# Patient Record
Sex: MALE | Race: WHITE | NOT HISPANIC OR LATINO | Employment: OTHER | URBAN - METROPOLITAN AREA
[De-identification: names, ages, dates, MRNs, and addresses within clinical notes are randomized per-mention and may not be internally consistent; named-entity substitution may affect disease eponyms.]

---

## 2019-02-07 ENCOUNTER — HOSPITAL ENCOUNTER (OUTPATIENT)
Dept: LAB | Facility: HOSPITAL | Age: 59
Discharge: HOME | End: 2019-02-07
Attending: INTERNAL MEDICINE

## 2019-02-07 LAB
25(OH)D3 SERPL-MCNC: 27.7 NG/ML (ref 30–100)
ALBUMIN SERPL-MCNC: 4.3 G/DL (ref 3.5–5)
ALBUMIN/GLOB SERPL: 1.3 {RATIO} (ref 1.4–2.6)
ALP SERPL-CCNC: 34 U/L (ref 56–119)
ALT SERPL-CCNC: 14 U/L (ref 10–40)
ANION GAP SERPL CALC-SCNC: 19 MMOL/L (ref 8–19)
AST SERPL-CCNC: 15 U/L (ref 15–50)
BASOPHILS # BLD AUTO: 0.05 10*3/UL (ref 0–0.2)
BASOPHILS NFR BLD AUTO: 0.58 % (ref 0–3)
BILIRUB SERPL-MCNC: 0.6 MG/DL (ref 0.2–1.3)
BUN SERPL-MCNC: 19 MG/DL (ref 5–25)
BUN/CREAT SERPL: 14 {RATIO} (ref 6–20)
CALCIUM SERPL-MCNC: 9.5 MG/DL (ref 8.7–10.4)
CHLORIDE SERPL-SCNC: 107 MMOL/L (ref 99–111)
CHOLEST SERPL-MCNC: 174 MG/DL (ref 107–200)
CHOLEST/HDLC SERPL: 4.8 {RATIO} (ref 3–6)
CONV CO2: 22 MMOL/L (ref 22–32)
CONV TOTAL PROTEIN: 7.5 G/DL (ref 6.3–8.2)
CREAT UR-MCNC: 1.32 MG/DL (ref 0.7–1.2)
EOSINOPHIL # BLD AUTO: 0.37 10*3/UL (ref 0–0.7)
EOSINOPHIL # BLD AUTO: 4.26 % (ref 0–7)
ERYTHROCYTE [DISTWIDTH] IN BLOOD BY AUTOMATED COUNT: 12 % (ref 11.5–14.5)
FOLATE SERPL-MCNC: 5.8 NG/ML (ref 4.8–20)
GFR SERPLBLD BASED ON 1.73 SQ M-ARVRAT: 58 ML/MIN/{1.73_M2}
GLOBULIN UR ELPH-MCNC: 3.2 G/DL (ref 2–3.5)
GLUCOSE SERPL-MCNC: 92 MG/DL (ref 70–99)
HBA1C MFR BLD: 14.9 G/DL (ref 14–18)
HCT VFR BLD AUTO: 43.8 % (ref 42–52)
HDLC SERPL-MCNC: 36 MG/DL (ref 40–60)
LDLC SERPL CALC-MCNC: 110 MG/DL (ref 70–100)
LYMPHOCYTES # BLD AUTO: 2.1 10*3/UL (ref 1–5)
MCH RBC QN AUTO: 29.7 PG (ref 27–31)
MCHC RBC AUTO-ENTMCNC: 34.1 G/DL (ref 33–37)
MCV RBC AUTO: 87 FL (ref 80–96)
MONOCYTES # BLD AUTO: 0.8 10*3/UL (ref 0.2–1.2)
MONOCYTES NFR BLD AUTO: 9.17 % (ref 3–10)
NEUTROPHILS # BLD AUTO: 5.35 10*3/UL (ref 2–8)
NEUTROPHILS NFR BLD AUTO: 61.7 % (ref 30–85)
NRBC BLD AUTO-RTO: 0 % (ref 0–0.01)
OSMOLALITY SERPL CALC.SUM OF ELEC: 302 MOSM/KG (ref 273–304)
PLATELET # BLD AUTO: 182 10*3/UL (ref 130–400)
PMV BLD AUTO: 8.9 FL (ref 7.4–10.4)
POTASSIUM SERPL-SCNC: 3.3 MMOL/L (ref 3.5–5.3)
PSA SERPL-MCNC: 3.92 NG/ML (ref 0–4)
RBC # BLD AUTO: 5.03 10*6/UL (ref 4.7–6.1)
SODIUM SERPL-SCNC: 145 MMOL/L (ref 135–147)
TRIGL SERPL-MCNC: 140 MG/DL (ref 40–150)
VARIANT LYMPHS NFR BLD MANUAL: 24.3 % (ref 20–45)
VIT B12 SERPL-MCNC: 301 PG/ML (ref 211–911)
VLDLC SERPL-MCNC: 28 MG/DL (ref 5–37)
WBC # BLD AUTO: 8.67 10*3/UL (ref 4.8–10.8)

## 2020-02-06 ENCOUNTER — HOSPITAL ENCOUNTER (OUTPATIENT)
Dept: LAB | Facility: HOSPITAL | Age: 60
Discharge: HOME | End: 2020-02-06
Attending: INTERNAL MEDICINE

## 2020-02-06 LAB
BASOPHILS # BLD AUTO: 0.09 10*3/UL (ref 0–0.2)
BASOPHILS NFR BLD AUTO: 0.9 % (ref 0–3)
CONV ABS IMM GRAN: 0.06 10*3/UL (ref 0–0.2)
CONV IMMATURE GRAN: 0.6 % (ref 0–1.8)
DEPRECATED RDW RBC AUTO: 42.7 FL (ref 35.1–43.9)
EOSINOPHIL # BLD AUTO: 0.3 10*3/UL (ref 0–0.7)
EOSINOPHIL # BLD AUTO: 2.8 % (ref 0–7)
ERYTHROCYTE [DISTWIDTH] IN BLOOD BY AUTOMATED COUNT: 13.5 % (ref 11.6–14.4)
HCT VFR BLD AUTO: 47 % (ref 42–52)
HGB BLD-MCNC: 15.3 G/DL (ref 14–18)
LYMPHOCYTES # BLD AUTO: 2.29 10*3/UL (ref 1–5)
LYMPHOCYTES NFR BLD AUTO: 21.7 % (ref 20–45)
MCH RBC QN AUTO: 28.4 PG (ref 27–31)
MCHC RBC AUTO-ENTMCNC: 32.6 G/DL (ref 33–37)
MCV RBC AUTO: 87.2 FL (ref 80–96)
MONOCYTES # BLD AUTO: 1.08 10*3/UL (ref 0.2–1.2)
MONOCYTES NFR BLD AUTO: 10.2 % (ref 3–10)
NEUTROPHILS # BLD AUTO: 6.72 10*3/UL (ref 2–8)
NEUTROPHILS NFR BLD AUTO: 63.8 % (ref 30–85)
NRBC CBCN: 0 % (ref 0–0.7)
PLATELET # BLD AUTO: 215 10*3/UL (ref 130–400)
PMV BLD AUTO: 11.9 FL (ref 9.4–12.4)
PSA SERPL-MCNC: 4.01 NG/ML (ref 0–4)
RBC # BLD AUTO: 5.39 10*6/UL (ref 4.7–6.1)
WBC # BLD AUTO: 10.54 10*3/UL (ref 4.8–10.8)

## 2020-02-07 LAB
ALBUMIN SERPL-MCNC: 4.5 G/DL (ref 3.5–5)
ALBUMIN/GLOB SERPL: 1.5 {RATIO} (ref 1.4–2.6)
ALP SERPL-CCNC: 36 U/L (ref 56–155)
ALT SERPL-CCNC: 18 U/L (ref 10–40)
ANION GAP SERPL CALC-SCNC: 18 MMOL/L (ref 8–19)
AST SERPL-CCNC: 18 U/L (ref 15–50)
BILIRUB SERPL-MCNC: 0.33 MG/DL (ref 0.2–1.3)
BUN SERPL-MCNC: 19 MG/DL (ref 5–25)
BUN/CREAT SERPL: 16 {RATIO} (ref 6–20)
CALCIUM SERPL-MCNC: 10.7 MG/DL (ref 8.7–10.4)
CHLORIDE SERPL-SCNC: 103 MMOL/L (ref 99–111)
CHOLEST SERPL-MCNC: 162 MG/DL (ref 107–200)
CHOLEST/HDLC SERPL: 5.4 {RATIO} (ref 3–6)
CONV CO2: 21 MMOL/L (ref 22–32)
CONV TOTAL PROTEIN: 7.5 G/DL (ref 6.3–8.2)
CREAT UR-MCNC: 1.16 MG/DL (ref 0.7–1.2)
GFR SERPLBLD BASED ON 1.73 SQ M-ARVRAT: >60 ML/MIN/{1.73_M2}
GLOBULIN UR ELPH-MCNC: 3 G/DL (ref 2–3.5)
GLUCOSE SERPL-MCNC: 87 MG/DL (ref 70–99)
HDLC SERPL-MCNC: 30 MG/DL (ref 40–60)
LDLC SERPL CALC-MCNC: 86 MG/DL (ref 70–100)
OSMOLALITY SERPL CALC.SUM OF ELEC: 288 MOSM/KG (ref 273–304)
POTASSIUM SERPL-SCNC: 3.9 MMOL/L (ref 3.5–5.3)
SODIUM SERPL-SCNC: 138 MMOL/L (ref 135–147)
TRIGL SERPL-MCNC: 231 MG/DL (ref 40–150)
VLDLC SERPL-MCNC: 46 MG/DL (ref 5–37)

## 2021-07-09 DIAGNOSIS — I10 HYPERTENSION, UNSPECIFIED TYPE: Primary | ICD-10-CM

## 2021-07-09 RX ORDER — LOSARTAN POTASSIUM 100 MG/1
100 TABLET ORAL DAILY
Qty: 90 TABLET | Refills: 1 | Status: SHIPPED | OUTPATIENT
Start: 2021-07-09 | End: 2022-01-05

## 2021-07-09 RX ORDER — LOSARTAN POTASSIUM 100 MG/1
100 TABLET ORAL DAILY
COMMUNITY
Start: 2021-04-11 | End: 2021-07-09 | Stop reason: SDUPTHER

## 2021-08-02 RX ORDER — POTASSIUM CHLORIDE 750 MG/1
TABLET, FILM COATED, EXTENDED RELEASE ORAL
Qty: 90 TABLET | Refills: 0 | Status: SHIPPED | OUTPATIENT
Start: 2021-08-02 | End: 2021-11-08

## 2021-09-08 RX ORDER — CETIRIZINE HYDROCHLORIDE 10 MG/1
TABLET ORAL
Qty: 90 TABLET | Refills: 1 | Status: SHIPPED | OUTPATIENT
Start: 2021-09-08 | End: 2022-03-21

## 2021-09-10 NOTE — PROGRESS NOTES
Chief Complaint/ HPI:   Follow-up (Pt. is here for routine check up, no labs done)  Coughing bad, here to follow-up with high blood pressure, high cholesterol, and reflux,  Complains of feet and legs hurting especially at night cannot sleep at times,  Objective   Vital Signs  Vitals:    09/13/21 1453   BP: 146/77   BP Location: Left arm   Patient Position: Sitting   Cuff Size: Large Adult   Pulse: 59   Temp: 97.5 °F (36.4 °C)   SpO2: 97%   Weight: 81.7 kg (180 lb 3.2 oz)      There is no height or weight on file to calculate BMI.  Review of Systems   Constitutional: Negative.    HENT: Negative.    Eyes: Negative.    Respiratory: Negative.    Cardiovascular: Negative.    Gastrointestinal: Negative.    Endocrine: Negative.    Genitourinary: Negative.    Musculoskeletal: Negative.    Allergic/Immunologic: Negative.    Neurological: Negative.    Hematological: Negative.    Psychiatric/Behavioral: Negative.    Bad cough,  Physical Exam  Constitutional:       General: He is not in acute distress.     Appearance: Normal appearance.   HENT:      Head: Normocephalic.      Mouth/Throat:      Mouth: Mucous membranes are moist.   Eyes:      Conjunctiva/sclera: Conjunctivae normal.      Pupils: Pupils are equal, round, and reactive to light.   Cardiovascular:      Rate and Rhythm: Normal rate and regular rhythm.      Pulses: Normal pulses.      Heart sounds: Normal heart sounds.   Pulmonary:      Effort: Pulmonary effort is normal.      Breath sounds: Normal breath sounds.   Abdominal:      General: Abdomen is flat. Bowel sounds are normal.      Palpations: Abdomen is soft.   Musculoskeletal:         General: No swelling. Normal range of motion.      Cervical back: Neck supple.   Skin:     General: Skin is warm and dry.      Coloration: Skin is not jaundiced.   Neurological:      Mental Status: He is alert and oriented to person, place, and time. Mental status is at baseline.      Motor: Weakness (Right upper extremity and right  lower extremity chronic weakness,) present.   Psychiatric:         Mood and Affect: Mood normal.         Behavior: Behavior normal.         Thought Content: Thought content normal.         Judgment: Judgment normal.     Patient has a 2-3+ PT pulse bilateral, lower legs are warm and dry with no edema    Result Review :   No results found for: PROBNP, BNP          No results found for: TSH   No results found for: FREET4                       No diagnosis found.    Assessment and Plan      B/L cerumen impactions-- irrigate today April 13, 2021    insomnia     mild anxiety ---    Heart murmur, discussed diagnosis of August 2020 recommend echo     Left foot trauma with numbness persisted over the past 12 months, will refer to podiatry for second opinion--NEVER SAW --old trauma to the left foot    CEREBRAL PALSY R ARM ,, right LE    Hypertension--Metoprolol 25 mg twice a day, Norvasc 5 mg daily,Losartan 100 mg daily, Potassium 10 MEQ daily,    Elevated cholesterol, triglycerides--Fenofibrate 160 mg daily    Gastroesophageal reflux,--PEPCID OTC QD     TOB ABUSE, REC QUITTING    Discussions regarding colonoscopy and endoscopy today February 2019 patient wants to wait,    Follow Up   No follow-ups on file.  Patient was given instructions and counseling regarding his condition or for health maintenance advice. Please see specific information pulled into the AVS if appropriate.

## 2021-09-13 ENCOUNTER — OFFICE VISIT (OUTPATIENT)
Dept: INTERNAL MEDICINE | Facility: CLINIC | Age: 61
End: 2021-09-13

## 2021-09-13 VITALS
OXYGEN SATURATION: 97 % | WEIGHT: 180.2 LBS | SYSTOLIC BLOOD PRESSURE: 146 MMHG | TEMPERATURE: 97.5 F | HEART RATE: 59 BPM | DIASTOLIC BLOOD PRESSURE: 77 MMHG

## 2021-09-13 DIAGNOSIS — Z12.5 SCREENING PSA (PROSTATE SPECIFIC ANTIGEN): ICD-10-CM

## 2021-09-13 DIAGNOSIS — I10 HYPERTENSION, ESSENTIAL: Primary | ICD-10-CM

## 2021-09-13 DIAGNOSIS — E78.2 MIXED HYPERLIPIDEMIA: ICD-10-CM

## 2021-09-13 DIAGNOSIS — G60.9 PERIPHERAL NEUROPATHY, IDIOPATHIC: ICD-10-CM

## 2021-09-13 DIAGNOSIS — K21.9 GASTROESOPHAGEAL REFLUX DISEASE WITHOUT ESOPHAGITIS: ICD-10-CM

## 2021-09-13 PROCEDURE — 99214 OFFICE O/P EST MOD 30 MIN: CPT | Performed by: INTERNAL MEDICINE

## 2021-09-13 RX ORDER — FENOFIBRATE 160 MG/1
160 TABLET ORAL DAILY
COMMUNITY
Start: 2021-07-28 | End: 2021-10-27

## 2021-09-13 RX ORDER — GABAPENTIN 100 MG/1
100 CAPSULE ORAL 3 TIMES DAILY
Qty: 90 CAPSULE | Refills: 2 | Status: SHIPPED | OUTPATIENT
Start: 2021-09-13

## 2021-09-13 RX ORDER — AMLODIPINE BESYLATE 5 MG/1
5 TABLET ORAL DAILY
COMMUNITY
Start: 2021-07-15

## 2021-09-29 ENCOUNTER — LAB (OUTPATIENT)
Dept: LAB | Facility: HOSPITAL | Age: 61
End: 2021-09-29

## 2021-09-29 DIAGNOSIS — G60.9 PERIPHERAL NEUROPATHY, IDIOPATHIC: ICD-10-CM

## 2021-09-29 DIAGNOSIS — Z12.5 SCREENING PSA (PROSTATE SPECIFIC ANTIGEN): ICD-10-CM

## 2021-09-29 DIAGNOSIS — E78.2 MIXED HYPERLIPIDEMIA: ICD-10-CM

## 2021-09-29 DIAGNOSIS — K21.9 GASTROESOPHAGEAL REFLUX DISEASE WITHOUT ESOPHAGITIS: ICD-10-CM

## 2021-09-29 DIAGNOSIS — I10 HYPERTENSION, ESSENTIAL: ICD-10-CM

## 2021-09-29 LAB
ALBUMIN SERPL-MCNC: 4.3 G/DL (ref 3.5–5.2)
ALBUMIN/GLOB SERPL: 1.5 G/DL
ALP SERPL-CCNC: 36 U/L (ref 39–117)
ALT SERPL W P-5'-P-CCNC: 15 U/L (ref 1–41)
ANION GAP SERPL CALCULATED.3IONS-SCNC: 10.2 MMOL/L (ref 5–15)
AST SERPL-CCNC: 18 U/L (ref 1–40)
BASOPHILS # BLD AUTO: 0.09 10*3/MM3 (ref 0–0.2)
BASOPHILS NFR BLD AUTO: 0.9 % (ref 0–1.5)
BILIRUB SERPL-MCNC: 0.3 MG/DL (ref 0–1.2)
BUN SERPL-MCNC: 25 MG/DL (ref 8–23)
BUN/CREAT SERPL: 21.9 (ref 7–25)
CALCIUM SPEC-SCNC: 9.5 MG/DL (ref 8.6–10.5)
CHLORIDE SERPL-SCNC: 107 MMOL/L (ref 98–107)
CHOLEST SERPL-MCNC: 140 MG/DL (ref 0–200)
CO2 SERPL-SCNC: 22.8 MMOL/L (ref 22–29)
CREAT SERPL-MCNC: 1.14 MG/DL (ref 0.76–1.27)
DEPRECATED RDW RBC AUTO: 44.3 FL (ref 37–54)
EOSINOPHIL # BLD AUTO: 0.36 10*3/MM3 (ref 0–0.4)
EOSINOPHIL NFR BLD AUTO: 3.6 % (ref 0.3–6.2)
ERYTHROCYTE [DISTWIDTH] IN BLOOD BY AUTOMATED COUNT: 13.6 % (ref 12.3–15.4)
GFR SERPL CREATININE-BSD FRML MDRD: 65 ML/MIN/1.73
GLOBULIN UR ELPH-MCNC: 2.9 GM/DL
GLUCOSE SERPL-MCNC: 93 MG/DL (ref 65–99)
HCT VFR BLD AUTO: 41.3 % (ref 37.5–51)
HDLC SERPL-MCNC: 28 MG/DL (ref 40–60)
HGB BLD-MCNC: 13.7 G/DL (ref 13–17.7)
IMM GRANULOCYTES # BLD AUTO: 0.08 10*3/MM3 (ref 0–0.05)
IMM GRANULOCYTES NFR BLD AUTO: 0.8 % (ref 0–0.5)
LDLC SERPL CALC-MCNC: 83 MG/DL (ref 0–100)
LDLC/HDLC SERPL: 2.82 {RATIO}
LYMPHOCYTES # BLD AUTO: 1.97 10*3/MM3 (ref 0.7–3.1)
LYMPHOCYTES NFR BLD AUTO: 19.4 % (ref 19.6–45.3)
MCH RBC QN AUTO: 29.1 PG (ref 26.6–33)
MCHC RBC AUTO-ENTMCNC: 33.2 G/DL (ref 31.5–35.7)
MCV RBC AUTO: 87.7 FL (ref 79–97)
MONOCYTES # BLD AUTO: 1.05 10*3/MM3 (ref 0.1–0.9)
MONOCYTES NFR BLD AUTO: 10.4 % (ref 5–12)
NEUTROPHILS NFR BLD AUTO: 6.58 10*3/MM3 (ref 1.7–7)
NEUTROPHILS NFR BLD AUTO: 64.9 % (ref 42.7–76)
NRBC BLD AUTO-RTO: 0 /100 WBC (ref 0–0.2)
PLATELET # BLD AUTO: 542 10*3/MM3 (ref 140–450)
PMV BLD AUTO: 10.4 FL (ref 6–12)
POTASSIUM SERPL-SCNC: 3.7 MMOL/L (ref 3.5–5.2)
PROT SERPL-MCNC: 7.2 G/DL (ref 6–8.5)
PSA SERPL-MCNC: 4.75 NG/ML (ref 0–4)
RBC # BLD AUTO: 4.71 10*6/MM3 (ref 4.14–5.8)
SODIUM SERPL-SCNC: 140 MMOL/L (ref 136–145)
TRIGL SERPL-MCNC: 165 MG/DL (ref 0–150)
VLDLC SERPL-MCNC: 29 MG/DL (ref 5–40)
WBC # BLD AUTO: 10.13 10*3/MM3 (ref 3.4–10.8)

## 2021-09-29 PROCEDURE — G0103 PSA SCREENING: HCPCS

## 2021-09-29 PROCEDURE — 80053 COMPREHEN METABOLIC PANEL: CPT

## 2021-09-29 PROCEDURE — 80061 LIPID PANEL: CPT

## 2021-09-29 PROCEDURE — 85025 COMPLETE CBC W/AUTO DIFF WBC: CPT

## 2021-09-29 PROCEDURE — 36415 COLL VENOUS BLD VENIPUNCTURE: CPT

## 2021-09-30 ENCOUNTER — TELEPHONE (OUTPATIENT)
Dept: INTERNAL MEDICINE | Facility: CLINIC | Age: 61
End: 2021-09-30

## 2021-09-30 DIAGNOSIS — R97.20 PSA ELEVATION: Primary | ICD-10-CM

## 2021-09-30 RX ORDER — CIPROFLOXACIN 500 MG/1
500 TABLET, FILM COATED ORAL 2 TIMES DAILY
Qty: 20 TABLET | Refills: 0 | Status: SHIPPED | OUTPATIENT
Start: 2021-09-30

## 2021-09-30 NOTE — TELEPHONE ENCOUNTER
Call patient, tell him lab work looked okay but his prostate test was higher and I think he needs to possibly see a urologist,-------------- make him a referral to Dr. Gil or to Dr. PERAZA or Dr. Snider    Also tell him we normally treat this with an antibiotic and then recheck it again in 1 to 2 months    Cipro 500 mg twice a day #20 no refills--- send this into patient's pharmacy and tell him to take it and do the blood work as below    Have him do a PSA in 6 to 8 weeks and call us for the results ------send the order in----diagnosis is elevated PSA, prostatitis

## 2021-10-27 RX ORDER — FENOFIBRATE 160 MG/1
TABLET ORAL
Qty: 90 TABLET | Refills: 1 | Status: SHIPPED | OUTPATIENT
Start: 2021-10-27 | End: 2022-05-02

## 2021-11-08 RX ORDER — POTASSIUM CHLORIDE 750 MG/1
TABLET, FILM COATED, EXTENDED RELEASE ORAL
Qty: 90 TABLET | Refills: 1 | Status: SHIPPED | OUTPATIENT
Start: 2021-11-08 | End: 2022-05-13

## 2022-01-05 RX ORDER — LOSARTAN POTASSIUM 100 MG/1
TABLET ORAL
Qty: 90 TABLET | Refills: 0 | Status: SHIPPED | OUTPATIENT
Start: 2022-01-05 | End: 2022-04-12 | Stop reason: SDUPTHER

## 2022-02-16 ENCOUNTER — OFFICE VISIT (OUTPATIENT)
Dept: INTERNAL MEDICINE | Facility: CLINIC | Age: 62
End: 2022-02-16

## 2022-02-16 VITALS
TEMPERATURE: 96.6 F | BODY MASS INDEX: 35.04 KG/M2 | SYSTOLIC BLOOD PRESSURE: 202 MMHG | HEART RATE: 61 BPM | OXYGEN SATURATION: 97 % | HEIGHT: 61 IN | WEIGHT: 185.6 LBS | DIASTOLIC BLOOD PRESSURE: 110 MMHG

## 2022-02-16 DIAGNOSIS — E55.9 VITAMIN D DEFICIENCY: ICD-10-CM

## 2022-02-16 DIAGNOSIS — I10 HYPERTENSION, UNSPECIFIED TYPE: Primary | ICD-10-CM

## 2022-02-16 DIAGNOSIS — E78.2 MIXED HYPERLIPIDEMIA: ICD-10-CM

## 2022-02-16 DIAGNOSIS — K21.9 GASTROESOPHAGEAL REFLUX DISEASE WITHOUT ESOPHAGITIS: ICD-10-CM

## 2022-02-16 DIAGNOSIS — Z71.6 TOBACCO ABUSE COUNSELING: ICD-10-CM

## 2022-02-16 DIAGNOSIS — Z12.5 SCREENING PSA (PROSTATE SPECIFIC ANTIGEN): ICD-10-CM

## 2022-02-16 PROCEDURE — 99214 OFFICE O/P EST MOD 30 MIN: CPT | Performed by: INTERNAL MEDICINE

## 2022-02-16 NOTE — PROGRESS NOTES
"Chief Complaint/ HPI:   Patient is here to follow-up hypertension, hyperlipidemia, cough is resolved, once his ears checked, increased stress with his mother's illness and currently she is in the hospital,  No falls  No cp nosoa        Objective   Vital Signs  Vitals:    02/16/22 1613   BP: (!) 202/110   BP Location: Left arm   Patient Position: Sitting   Cuff Size: Large Adult   Pulse: 61   Temp: 96.6 °F (35.9 °C)   SpO2: 97%   Weight: 84.2 kg (185 lb 9.6 oz)   Height: 154.9 cm (61\")      Body mass index is 35.07 kg/m².  Review of Systems   Constitutional: Negative.    HENT: Negative.    Eyes: Negative.    Respiratory: Negative.    Cardiovascular: Negative.    Gastrointestinal: Negative.    Endocrine: Negative.    Genitourinary: Negative.    Musculoskeletal: Negative.    Allergic/Immunologic: Negative.    Neurological: Negative.    Hematological: Negative.    Psychiatric/Behavioral: Negative.       Physical Exam  Constitutional:       General: He is not in acute distress.     Appearance: Normal appearance.   HENT:      Head: Normocephalic.      Right Ear: There is no impacted cerumen.      Left Ear: There is impacted cerumen.      Mouth/Throat:      Mouth: Mucous membranes are moist.   Eyes:      Conjunctiva/sclera: Conjunctivae normal.      Pupils: Pupils are equal, round, and reactive to light.   Cardiovascular:      Rate and Rhythm: Normal rate and regular rhythm.      Pulses: Normal pulses.      Heart sounds: Normal heart sounds.   Pulmonary:      Effort: Pulmonary effort is normal.      Breath sounds: Normal breath sounds.   Abdominal:      General: Abdomen is flat. Bowel sounds are normal.      Palpations: Abdomen is soft.   Musculoskeletal:         General: No swelling. Normal range of motion.      Cervical back: Neck supple.   Skin:     General: Skin is warm and dry.      Coloration: Skin is not jaundiced.   Neurological:      General: No focal deficit present.      Mental Status: He is alert and oriented " to person, place, and time. Mental status is at baseline.      Motor: Weakness (right arm chronic weakness,) present.      Gait: Gait abnormal.   Psychiatric:         Mood and Affect: Mood normal.         Behavior: Behavior normal.         Thought Content: Thought content normal.         Judgment: Judgment normal.        Result Review :   No results found for: PROBNP, BNP  CMP    CMP 9/29/21   Glucose 93   BUN 25 (A)   Creatinine 1.14   eGFR Non African Am 65   Sodium 140   Potassium 3.7   Chloride 107   Calcium 9.5   Albumin 4.30   Total Bilirubin 0.3   Alkaline Phosphatase 36 (A)   AST (SGOT) 18   ALT (SGPT) 15   (A) Abnormal value            CBC w/diff    CBC w/Diff 9/29/21   WBC 10.13   RBC 4.71   Hemoglobin 13.7   Hematocrit 41.3   MCV 87.7   MCH 29.1   MCHC 33.2   RDW 13.6   Platelets 542 (A)   Neutrophil Rel % 64.9   Immature Granulocyte Rel % 0.8 (A)   Lymphocyte Rel % 19.4 (A)   Monocyte Rel % 10.4   Eosinophil Rel % 3.6   Basophil Rel % 0.9   (A) Abnormal value             Lipid Panel    Lipid Panel 9/29/21   Total Cholesterol 140   Triglycerides 165 (A)   HDL Cholesterol 28 (A)   VLDL Cholesterol 29   LDL Cholesterol  83   LDL/HDL Ratio 2.82   (A) Abnormal value             No results found for: TSH   No results found for: FREET4      PSA    PSA 9/29/21   PSA 4.750 (A)   (A) Abnormal value                           Visit Diagnoses:    ICD-10-CM ICD-9-CM   1. Hypertension, unspecified type  I10 401.9   2. Mixed hyperlipidemia  E78.2 272.2   3. Gastroesophageal reflux disease without esophagitis  K21.9 530.81   4. Screening PSA (prostate specific antigen)  Z12.5 V76.44   5. Tobacco abuse counseling  Z71.6 V65.42     305.1   6. Vitamin D deficiency  E55.9 268.9       Assessment and Plan   Diagnoses and all orders for this visit:    1. Hypertension, unspecified type (Primary)  -     Lipid Panel; Future  -     Comprehensive Metabolic Panel; Future  -     CBC & Differential; Future  -     PSA Screen; Future  -      Vitamin B12 anemia; Future  -     Folate anemia; Future  -     TSH+Free T4; Future  -     Vitamin D 25 Hydroxy; Future    2. Mixed hyperlipidemia  -     Lipid Panel; Future  -     Comprehensive Metabolic Panel; Future  -     CBC & Differential; Future  -     PSA Screen; Future  -     Vitamin B12 anemia; Future  -     Folate anemia; Future  -     TSH+Free T4; Future  -     Vitamin D 25 Hydroxy; Future    3. Gastroesophageal reflux disease without esophagitis  -     Lipid Panel; Future  -     Comprehensive Metabolic Panel; Future  -     CBC & Differential; Future  -     PSA Screen; Future  -     Vitamin B12 anemia; Future  -     Folate anemia; Future  -     TSH+Free T4; Future  -     Vitamin D 25 Hydroxy; Future    4. Screening PSA (prostate specific antigen)  -     Lipid Panel; Future  -     Comprehensive Metabolic Panel; Future  -     CBC & Differential; Future  -     PSA Screen; Future  -     Vitamin B12 anemia; Future  -     Folate anemia; Future  -     TSH+Free T4; Future  -     Vitamin D 25 Hydroxy; Future    5. Tobacco abuse counseling  -     Lipid Panel; Future  -     Comprehensive Metabolic Panel; Future  -     CBC & Differential; Future  -     PSA Screen; Future  -     Vitamin B12 anemia; Future  -     Folate anemia; Future  -     TSH+Free T4; Future  -     Vitamin D 25 Hydroxy; Future    6. Vitamin D deficiency  -     Lipid Panel; Future  -     Comprehensive Metabolic Panel; Future  -     CBC & Differential; Future  -     PSA Screen; Future  -     Vitamin B12 anemia; Future  -     Folate anemia; Future  -     TSH+Free T4; Future  -     Vitamin D 25 Hydroxy; Future        Tobacco abuse, counseled patient on smoking cessation February 2022    Increasing PSA over the past 1 to 2 years, recommend recheck soon February 2022    B/L cerumen impactions-- irrigate today April 13, 2021, will reirrigate again February 2022, left ear is worse than right,    insomnia     mild anxiety, says tobacco helps with his  nerves, stress,    Heart murmur, discussed diagnosis -- August 2020 recommend echo     CEREBRAL PALSY R ARM ,, right LE    Hypertension--continues metoprolol 25 mg twice a day, Norvasc 5 mg daily,Losartan 100 mg daily, Potassium 10 MEQ daily, encourage patient to monitor blood pressures closely since are so high today, possibly whitecoat syndrome    Elevated cholesterol, triglycerides--continues fenofibrate 160 mg daily    Gastroesophageal reflux,--no rx     Discussions regarding colonoscopy and endoscopy today February 2019 and again February 2022 patient wants to wait,        Follow Up   Return in about 4 months (around 6/16/2022).  Patient was given instructions and counseling regarding his condition or for health maintenance advice. Please see specific information pulled into the AVS if appropriate.

## 2022-03-21 RX ORDER — CETIRIZINE HYDROCHLORIDE 10 MG/1
TABLET ORAL
Qty: 90 TABLET | Refills: 3 | Status: SHIPPED | OUTPATIENT
Start: 2022-03-21

## 2022-04-12 RX ORDER — LOSARTAN POTASSIUM 100 MG/1
100 TABLET ORAL DAILY
Qty: 90 TABLET | Refills: 3 | Status: SHIPPED | OUTPATIENT
Start: 2022-04-12

## 2022-05-02 RX ORDER — FENOFIBRATE 160 MG/1
TABLET ORAL
Qty: 90 TABLET | Refills: 0 | Status: SHIPPED | OUTPATIENT
Start: 2022-05-02 | End: 2022-08-11

## 2022-05-09 ENCOUNTER — TELEPHONE (OUTPATIENT)
Dept: INTERNAL MEDICINE | Facility: CLINIC | Age: 62
End: 2022-05-09

## 2022-05-09 NOTE — TELEPHONE ENCOUNTER
Caller: Daniel Mcleod    Relationship: Self    Best call back number: [phone]    What is the best time to reach you: ANY    Who are you requesting to speak with (clinical staff, provider,  specific staff member): CLINICAL STAFF    What was the call regarding: PATIENT CALLED STATING THAT HE MISSED A CALL FROM THE OFFICE AND WOULD LIKE TO SPEAK TO A NURSE REGARDING THE MESSAGE    Do you require a callback: YES

## 2022-05-13 RX ORDER — POTASSIUM CHLORIDE 750 MG/1
TABLET, FILM COATED, EXTENDED RELEASE ORAL
Qty: 90 TABLET | Refills: 0 | Status: SHIPPED | OUTPATIENT
Start: 2022-05-13 | End: 2022-10-24

## 2022-08-11 RX ORDER — FENOFIBRATE 160 MG/1
TABLET ORAL
Qty: 90 TABLET | Refills: 0 | Status: SHIPPED | OUTPATIENT
Start: 2022-08-11 | End: 2022-12-30

## 2022-10-24 RX ORDER — POTASSIUM CHLORIDE 750 MG/1
TABLET, FILM COATED, EXTENDED RELEASE ORAL
Qty: 90 TABLET | Refills: 0 | Status: SHIPPED | OUTPATIENT
Start: 2022-10-24 | End: 2023-01-26

## 2022-12-30 RX ORDER — FENOFIBRATE 160 MG/1
TABLET ORAL
Qty: 90 TABLET | Refills: 0 | Status: SHIPPED | OUTPATIENT
Start: 2022-12-30

## 2023-01-26 RX ORDER — POTASSIUM CHLORIDE 750 MG/1
TABLET, FILM COATED, EXTENDED RELEASE ORAL
Qty: 90 TABLET | Refills: 0 | Status: SHIPPED | OUTPATIENT
Start: 2023-01-26

## 2023-04-10 RX ORDER — FENOFIBRATE 160 MG/1
TABLET ORAL
Qty: 90 TABLET | Refills: 0 | Status: SHIPPED | OUTPATIENT
Start: 2023-04-10

## 2023-04-28 RX ORDER — POTASSIUM CHLORIDE 750 MG/1
10 TABLET, FILM COATED, EXTENDED RELEASE ORAL DAILY
Qty: 90 TABLET | Refills: 3 | Status: SHIPPED | OUTPATIENT
Start: 2023-04-28 | End: 2023-05-01 | Stop reason: SDUPTHER

## 2023-04-28 NOTE — TELEPHONE ENCOUNTER
Rx Refill Note  Requested Prescriptions     Pending Prescriptions Disp Refills   • potassium chloride 10 MEQ CR tablet 90 tablet 0     Sig: Take 1 tablet by mouth Daily. with food.      Last office visit with prescribing clinician: 2/16/2022   Last telemedicine visit with prescribing clinician: 5/1/2023   Next office visit with prescribing clinician: 5/1/2023                         Would you like a call back once the refill request has been completed: [] Yes [] No    If the office needs to give you a call back, can they leave a voicemail: [] Yes [] No    Lisa Win MA  04/28/23, 15:15 EDT

## 2023-05-01 ENCOUNTER — OFFICE VISIT (OUTPATIENT)
Dept: INTERNAL MEDICINE | Facility: CLINIC | Age: 63
End: 2023-05-01
Payer: MEDICARE

## 2023-05-01 VITALS
OXYGEN SATURATION: 97 % | SYSTOLIC BLOOD PRESSURE: 226 MMHG | HEIGHT: 61 IN | TEMPERATURE: 98.4 F | WEIGHT: 185.8 LBS | DIASTOLIC BLOOD PRESSURE: 99 MMHG | HEART RATE: 67 BPM | BODY MASS INDEX: 35.08 KG/M2

## 2023-05-01 DIAGNOSIS — E55.9 VITAMIN D DEFICIENCY: ICD-10-CM

## 2023-05-01 DIAGNOSIS — I10 HYPERTENSION, UNSPECIFIED TYPE: ICD-10-CM

## 2023-05-01 DIAGNOSIS — K21.9 GASTROESOPHAGEAL REFLUX DISEASE WITHOUT ESOPHAGITIS: ICD-10-CM

## 2023-05-01 DIAGNOSIS — Z12.5 SCREENING PSA (PROSTATE SPECIFIC ANTIGEN): ICD-10-CM

## 2023-05-01 DIAGNOSIS — Z71.6 TOBACCO ABUSE COUNSELING: ICD-10-CM

## 2023-05-01 DIAGNOSIS — E78.2 MIXED HYPERLIPIDEMIA: Primary | ICD-10-CM

## 2023-05-01 RX ORDER — LOSARTAN POTASSIUM 100 MG/1
100 TABLET ORAL DAILY
Qty: 90 TABLET | Refills: 3 | Status: SHIPPED | OUTPATIENT
Start: 2023-05-01 | End: 2023-05-01

## 2023-05-01 RX ORDER — FENOFIBRATE 160 MG/1
160 TABLET ORAL DAILY
Qty: 90 TABLET | Refills: 3 | Status: SHIPPED | OUTPATIENT
Start: 2023-05-01

## 2023-05-01 RX ORDER — POTASSIUM CHLORIDE 750 MG/1
10 TABLET, FILM COATED, EXTENDED RELEASE ORAL DAILY
Qty: 90 TABLET | Refills: 3 | Status: SHIPPED | OUTPATIENT
Start: 2023-05-01

## 2023-05-01 RX ORDER — GABAPENTIN 100 MG/1
100 CAPSULE ORAL 3 TIMES DAILY
Qty: 90 CAPSULE | Refills: 3 | Status: SHIPPED | OUTPATIENT
Start: 2023-05-01

## 2023-05-01 RX ORDER — VALSARTAN 320 MG/1
320 TABLET ORAL DAILY
Qty: 90 TABLET | Refills: 3 | Status: SHIPPED | OUTPATIENT
Start: 2023-05-01

## 2023-05-01 NOTE — PROGRESS NOTES
"CHIEF COMPLAINT/ HPI:  Hypertension and Follow-up (Patient is here for a routine follow up)    Follow-up for his annual exam, he is complaining of left foot and leg pain radiculopathy neuropathy type symptoms he is wanting something for pain, he is here to get checked and he has not had any blood work recently,          Objective   Vital Signs  Vitals:    05/01/23 1145 05/01/23 1146   BP: (!) 203/108 (!) 226/99   Pulse: 67    Temp: 98.4 °F (36.9 °C)    SpO2: 97%    Weight: 84.3 kg (185 lb 12.8 oz)    Height: 154.9 cm (60.98\")       Body mass index is 35.13 kg/m².  Review of Systems   Constitutional: Negative.    HENT: Negative.    Eyes: Negative.    Respiratory: Negative.    Cardiovascular: Negative.    Gastrointestinal: Negative.    Endocrine: Negative.    Genitourinary: Negative.    Musculoskeletal: Negative.    Allergic/Immunologic: Negative.    Neurological: Negative.    Hematological: Negative.    Psychiatric/Behavioral: Negative.       Physical Exam  Constitutional:       General: He is not in acute distress.     Appearance: Normal appearance. He is obese.   HENT:      Head: Normocephalic.      Mouth/Throat:      Mouth: Mucous membranes are moist.   Eyes:      Conjunctiva/sclera: Conjunctivae normal.      Pupils: Pupils are equal, round, and reactive to light.   Cardiovascular:      Rate and Rhythm: Normal rate and regular rhythm.      Pulses: Normal pulses.      Heart sounds: Normal heart sounds.   Pulmonary:      Effort: Pulmonary effort is normal.      Breath sounds: Normal breath sounds.   Abdominal:      General: Bowel sounds are normal.      Palpations: Abdomen is soft.   Musculoskeletal:         General: No swelling. Normal range of motion.      Cervical back: Neck supple.   Skin:     General: Skin is warm and dry.      Coloration: Skin is not jaundiced.   Neurological:      General: No focal deficit present.      Mental Status: He is alert and oriented to person, place, and time. Mental status is at " baseline.   Psychiatric:         Mood and Affect: Mood normal.         Behavior: Behavior normal.         Thought Content: Thought content normal.         Judgment: Judgment normal.        Result Review :   No results found for: PROBNP, BNP          No results found for: TSH   No results found for: FREET4                       Visit Diagnoses:    ICD-10-CM ICD-9-CM   1. Mixed hyperlipidemia  E78.2 272.2   2. Tobacco abuse counseling  Z71.6 V65.42     305.1   3. Screening PSA (prostate specific antigen)  Z12.5 V76.44   4. Hypertension, unspecified type  I10 401.9   5. Gastroesophageal reflux disease without esophagitis  K21.9 530.81   6. Vitamin D deficiency  E55.9 268.9       Assessment and Plan   Diagnoses and all orders for this visit:    1. Mixed hyperlipidemia (Primary)  -     Hepatitis C antibody; Future  -     Comprehensive Metabolic Panel; Future  -     CBC & Differential; Future  -     Lipid Panel; Future  -     PSA Screen; Future  -     TSH+Free T4; Future  -     gabapentin (NEURONTIN) 100 MG capsule; Take 1 capsule by mouth 3 (Three) Times a Day.  Dispense: 90 capsule; Refill: 3    2. Tobacco abuse counseling  -     Hepatitis C antibody; Future  -     Comprehensive Metabolic Panel; Future  -     CBC & Differential; Future  -     Lipid Panel; Future  -     PSA Screen; Future  -     TSH+Free T4; Future  -     gabapentin (NEURONTIN) 100 MG capsule; Take 1 capsule by mouth 3 (Three) Times a Day.  Dispense: 90 capsule; Refill: 3    3. Screening PSA (prostate specific antigen)  -     Hepatitis C antibody; Future  -     Comprehensive Metabolic Panel; Future  -     CBC & Differential; Future  -     Lipid Panel; Future  -     PSA Screen; Future  -     TSH+Free T4; Future  -     gabapentin (NEURONTIN) 100 MG capsule; Take 1 capsule by mouth 3 (Three) Times a Day.  Dispense: 90 capsule; Refill: 3    4. Hypertension, unspecified type  -     Hepatitis C antibody; Future  -     Comprehensive Metabolic Panel; Future  -      CBC & Differential; Future  -     Lipid Panel; Future  -     PSA Screen; Future  -     TSH+Free T4; Future  -     gabapentin (NEURONTIN) 100 MG capsule; Take 1 capsule by mouth 3 (Three) Times a Day.  Dispense: 90 capsule; Refill: 3    5. Gastroesophageal reflux disease without esophagitis  -     Hepatitis C antibody; Future  -     Comprehensive Metabolic Panel; Future  -     CBC & Differential; Future  -     Lipid Panel; Future  -     PSA Screen; Future  -     TSH+Free T4; Future  -     gabapentin (NEURONTIN) 100 MG capsule; Take 1 capsule by mouth 3 (Three) Times a Day.  Dispense: 90 capsule; Refill: 3    6. Vitamin D deficiency  -     Hepatitis C antibody; Future  -     Comprehensive Metabolic Panel; Future  -     CBC & Differential; Future  -     Lipid Panel; Future  -     PSA Screen; Future  -     TSH+Free T4; Future  -     gabapentin (NEURONTIN) 100 MG capsule; Take 1 capsule by mouth 3 (Three) Times a Day.  Dispense: 90 capsule; Refill: 3    Other orders  -     fenofibrate 160 MG tablet; Take 1 tablet by mouth Daily.  Dispense: 90 tablet; Refill: 3  -     Discontinue: losartan (COZAAR) 100 MG tablet; Take 1 tablet by mouth Daily.  Dispense: 90 tablet; Refill: 3  -     metoprolol tartrate (LOPRESSOR) 25 MG tablet; Take 1 tablet by mouth 2 (Two) Times a Day With Meals.  Dispense: 180 tablet; Refill: 3  -     potassium chloride 10 MEQ CR tablet; Take 1 tablet by mouth Daily. with food.  Dispense: 90 tablet; Refill: 3  -     valsartan (DIOVAN) 320 MG tablet; Take 1 tablet by mouth Daily.  Dispense: 90 tablet; Refill: 3             Annual checkup, preventative measures discussed with patient, encouraged him not to smoke, encouraged him to walk is much as he can on a regular basis, he does not drink alcohol, he does wear seatbelt when he drives, encouraged him to stay as active as possible and control his stress he does visit his mother at the nursing home on a regular basis, May 1, 2023     Tobacco abuse,  counseled patient on smoking cessation February 2022    Increasing PSA over the past 1 to 2 years, recommend recheck soon February 2022    B/L cerumen impactions-- irrigate today April 13, 2021, will reirrigate again February 2022, left ear is worse than right,    insomnia     mild anxiety, says tobacco helps with his nerves, stress,    Heart murmur, discussed diagnosis -- August 2020 --recommend echo     CEREBRAL PALSY R ARM ,, right LE, neuropathy pain in his left leg asking for something to help with pain issues,    Hypertension--continues metoprolol 25 mg twice a day, Norvasc 5 mg daily,Losartan 100 mg daily, Potassium 10 MEQ daily, encourage patient to monitor blood pressures closely since are so high today, possibly whitecoat syndrome, May 1, 2023    Elevated cholesterol, triglycerides--continues fenofibrate 160 mg paulino    Discussions regarding colonoscopy and endoscopy today February 2019 and again February 2022 patient wants to wait,            Follow Up   Return in about 4 months (around 9/1/2023).  Patient was given instructions and counseling regarding his condition or for health maintenance advice. Please see specific information pulled into the AVS if appropriate.

## 2023-08-07 ENCOUNTER — TELEPHONE (OUTPATIENT)
Dept: INTERNAL MEDICINE | Facility: CLINIC | Age: 63
End: 2023-08-07
Payer: MEDICARE

## 2023-08-07 NOTE — TELEPHONE ENCOUNTER
ATTEMPTED TO MAKE CONTACT, REQUESTED PATIENT CALLBACK, PLEASE RESCHEDULED CURRENT APPOINTMENT ON 09/01, PROVIDER OUT OF OFFICE.

## 2023-08-08 ENCOUNTER — TELEPHONE (OUTPATIENT)
Dept: INTERNAL MEDICINE | Facility: CLINIC | Age: 63
End: 2023-08-08
Payer: MEDICARE

## 2023-08-08 NOTE — TELEPHONE ENCOUNTER
ATTEMPTED TO MAKE CONTACT, LEFT VOICEMAIL REQUESTING PATIENT CALL OFFICE FOR ASSISTANCE WITH RESCHEDULING. PROVIDER OUT OF OFFICE ON 09/01/23. PLEASE RESCHEDULE

## 2023-08-17 ENCOUNTER — TELEPHONE (OUTPATIENT)
Dept: INTERNAL MEDICINE | Facility: CLINIC | Age: 63
End: 2023-08-17
Payer: MEDICARE

## 2023-08-17 NOTE — TELEPHONE ENCOUNTER
Call patient find out which medicine he is talking about that he was taking before and we can call that in and make him an appointment to see me in the next week or 2 to discuss all of his medications and help sort this out

## 2023-08-17 NOTE — TELEPHONE ENCOUNTER
Pt states he hasn't been taking his valsartan because it makes him feel high and sick to his stomach and would like to go back on his old medicine because he did fine on that. Please advise

## 2023-08-17 NOTE — TELEPHONE ENCOUNTER
Caller: Daniel Mcleod    Relationship: Self    Best call back number: [phone]     What is the best time to reach you: ANY    Who are you requesting to speak with (clinical staff, provider,  specific staff member): CLINICAL STAFF    Do you know the name of the person who called: DAVID    What was the call regarding: PATIENT CALLED STATING THAT HIS MEDICATIONS ARE ALL MESSED UP. HE WOULD LIKE TO KNOW WHY HIS MEDICATIONS CANNOT BE FILLED AND HIS NEW BLOOD PRESSURE MEDICATION IS HURTING HIS STOMACH AND CAUSING HIM TO FEEL HIGH

## 2023-08-18 ENCOUNTER — TELEPHONE (OUTPATIENT)
Dept: INTERNAL MEDICINE | Facility: CLINIC | Age: 63
End: 2023-08-18
Payer: MEDICARE

## 2023-08-18 RX ORDER — LOSARTAN POTASSIUM 100 MG/1
100 TABLET ORAL DAILY
Qty: 90 TABLET | Refills: 1 | Status: SHIPPED | OUTPATIENT
Start: 2023-08-18

## 2023-08-18 NOTE — TELEPHONE ENCOUNTER
Caller: Daniel Mcleod    Relationship to patient: Self    Best call back number: [phone]    Patient is needing: PATIENT RETURNING MISSED CALL FROM THE OFFICE. PATIENT STATES HE DID NOT KNOW THE NAME OF THE OTHER BLOOD PRESSURE MEDICATION HE WAS TAKING.

## 2023-08-18 NOTE — TELEPHONE ENCOUNTER
Spoke with patient and he stated he does not remember the name of the previous medication, I called [facility] pharmacy and they stated last medication that was discontinue was losartan 100 mg 1 tab daily.

## 2024-02-14 RX ORDER — LOSARTAN POTASSIUM 100 MG/1
100 TABLET ORAL DAILY
Qty: 90 TABLET | Refills: 0 | Status: SHIPPED | OUTPATIENT
Start: 2024-02-14

## 2024-04-08 RX ORDER — POTASSIUM CHLORIDE 750 MG/1
10 TABLET, FILM COATED, EXTENDED RELEASE ORAL DAILY
Qty: 90 TABLET | Refills: 0 | Status: SHIPPED | OUTPATIENT
Start: 2024-04-08

## 2024-05-02 NOTE — TELEPHONE ENCOUNTER
Rx Refill Note  Requested Prescriptions     Pending Prescriptions Disp Refills    metoprolol tartrate (LOPRESSOR) 25 MG tablet [Pharmacy Med Name: Metoprolol Tartrate 25 MG Oral Tablet] 180 tablet 0     Sig: TAKE 1 TABLET BY MOUTH TWICE DAILY WITH MEALS      Last office visit with prescribing clinician: 5/1/2023   Last telemedicine visit with prescribing clinician: Visit date not found   Next office visit with prescribing clinician: Visit date not found                         Would you like a call back once the refill request has been completed: [] Yes [] No    If the office needs to give you a call back, can they leave a voicemail: [] Yes [] No    Clari He MA  05/02/24, 07:08 EDT

## 2024-05-15 RX ORDER — LOSARTAN POTASSIUM 100 MG/1
100 TABLET ORAL DAILY
Qty: 90 TABLET | Refills: 3 | Status: SHIPPED | OUTPATIENT
Start: 2024-05-15

## 2024-05-15 NOTE — TELEPHONE ENCOUNTER
Rx Refill Note  Requested Prescriptions     Pending Prescriptions Disp Refills    losartan (COZAAR) 100 MG tablet [Pharmacy Med Name: Losartan Potassium 100 MG Oral Tablet] 90 tablet 0     Sig: Take 1 tablet by mouth once daily      Last office visit with prescribing clinician: 5/1/2023   Last telemedicine visit with prescribing clinician: Visit date not found   Next office visit with prescribing clinician: Visit date not found                         Would you like a call back once the refill request has been completed: [] Yes [] No    If the office needs to give you a call back, can they leave a voicemail: [] Yes [] No    Sanjuanita Montoya MA  05/15/24, 11:14 EDT

## 2024-07-01 RX ORDER — FENOFIBRATE 160 MG/1
160 TABLET ORAL DAILY
Qty: 90 TABLET | Refills: 0 | OUTPATIENT
Start: 2024-07-01

## 2024-07-03 RX ORDER — POTASSIUM CHLORIDE 750 MG/1
10 TABLET, FILM COATED, EXTENDED RELEASE ORAL DAILY
Qty: 90 TABLET | Refills: 0 | OUTPATIENT
Start: 2024-07-03

## 2024-07-12 RX ORDER — FENOFIBRATE 160 MG/1
160 TABLET ORAL DAILY
Qty: 90 TABLET | Refills: 0 | OUTPATIENT
Start: 2024-07-12

## 2024-07-22 RX ORDER — FENOFIBRATE 160 MG/1
160 TABLET ORAL DAILY
Qty: 90 TABLET | Refills: 0 | OUTPATIENT
Start: 2024-07-22

## 2024-07-22 RX ORDER — POTASSIUM CHLORIDE 750 MG/1
10 TABLET, FILM COATED, EXTENDED RELEASE ORAL DAILY
Qty: 90 TABLET | Refills: 0 | OUTPATIENT
Start: 2024-07-22

## 2024-10-09 ENCOUNTER — OFFICE VISIT (OUTPATIENT)
Dept: INTERNAL MEDICINE | Age: 64
End: 2024-10-09
Payer: MEDICARE

## 2024-10-09 ENCOUNTER — TELEPHONE (OUTPATIENT)
Dept: INTERNAL MEDICINE | Age: 64
End: 2024-10-09
Payer: MEDICARE

## 2024-10-09 VITALS
BODY MASS INDEX: 34.55 KG/M2 | SYSTOLIC BLOOD PRESSURE: 178 MMHG | HEART RATE: 72 BPM | OXYGEN SATURATION: 96 % | TEMPERATURE: 96.9 F | DIASTOLIC BLOOD PRESSURE: 92 MMHG | WEIGHT: 183 LBS | HEIGHT: 61 IN

## 2024-10-09 DIAGNOSIS — E78.2 MIXED HYPERLIPIDEMIA: ICD-10-CM

## 2024-10-09 DIAGNOSIS — I10 HYPERTENSION, UNSPECIFIED TYPE: Primary | ICD-10-CM

## 2024-10-09 DIAGNOSIS — J40 BRONCHITIS: Primary | ICD-10-CM

## 2024-10-09 DIAGNOSIS — I10 PRIMARY HYPERTENSION: ICD-10-CM

## 2024-10-09 DIAGNOSIS — H61.22 IMPACTED CERUMEN OF LEFT EAR: ICD-10-CM

## 2024-10-09 PROCEDURE — 3080F DIAST BP >= 90 MM HG: CPT | Performed by: NURSE PRACTITIONER

## 2024-10-09 PROCEDURE — 1160F RVW MEDS BY RX/DR IN RCRD: CPT | Performed by: NURSE PRACTITIONER

## 2024-10-09 PROCEDURE — 99214 OFFICE O/P EST MOD 30 MIN: CPT | Performed by: NURSE PRACTITIONER

## 2024-10-09 PROCEDURE — 1126F AMNT PAIN NOTED NONE PRSNT: CPT | Performed by: NURSE PRACTITIONER

## 2024-10-09 PROCEDURE — 1159F MED LIST DOCD IN RCRD: CPT | Performed by: NURSE PRACTITIONER

## 2024-10-09 PROCEDURE — 69210 REMOVE IMPACTED EAR WAX UNI: CPT | Performed by: NURSE PRACTITIONER

## 2024-10-09 PROCEDURE — 3077F SYST BP >= 140 MM HG: CPT | Performed by: NURSE PRACTITIONER

## 2024-10-09 RX ORDER — FENOFIBRATE 160 MG/1
160 TABLET ORAL DAILY
Qty: 90 TABLET | Refills: 3 | Status: SHIPPED | OUTPATIENT
Start: 2024-10-09

## 2024-10-09 RX ORDER — PREDNISONE 5 MG/1
5 TABLET ORAL DAILY
Qty: 14 TABLET | Refills: 0 | Status: SHIPPED | OUTPATIENT
Start: 2024-10-09

## 2024-10-09 RX ORDER — AZITHROMYCIN 250 MG/1
TABLET, FILM COATED ORAL
Qty: 6 TABLET | Refills: 0 | Status: SHIPPED | OUTPATIENT
Start: 2024-10-09

## 2024-10-09 RX ORDER — POTASSIUM CHLORIDE 750 MG/1
10 TABLET, EXTENDED RELEASE ORAL DAILY
Qty: 90 TABLET | Refills: 0 | Status: SHIPPED | OUTPATIENT
Start: 2024-10-09

## 2024-10-09 NOTE — PATIENT INSTRUCTIONS
Patient instructed to take all medications as noted on the bottles.  Would like to see him in 1 week for follow-up on his blood pressure with his primary care Dr. De Souza or myself bring all medications with you.  Decrease your sodium and caffeine intake.  Monitor blood pressure at home keep a log goal of 140/90  Take antibiotic and steroids for probable bronchitis encouraged not to smoke force fluids and rest will recheck in 1 week  Use Debrox earwax softening drops as instructed on the bottle 4-5 times a day in the left ear no Q-tips will recheck ear again in 1 week and attempt to remove the rest of the cerumen.

## 2024-10-09 NOTE — PROGRESS NOTES
"Chief Complaint  Ear Fullness (Pt complains of Left ear being clogged and coughing. Coughing been 1 week. Ear clogging been only today)    Subjective      Daniel Mcleod is a 64 y.o. male who presents to [facility]     History of Present Illness  The patient presents for evaluation of acute illness he is complaining of cough congestion tickle in the back of his throat for over a week and left ear being stopped.        However upon triage his blood pressure is quite elevated 184/98 he is allowed to sit and rest blood pressure does come down to  178/92.  When questioning about his medications he has 2 ARB's listed in reviewing his history and previous notes his losartan has been discontinued and currently should be taking Diovan however he states he is not taking it.  He is taking his Lopressor but on his own discontinued the Diovan due to making him feel \"drunk\" but when discussing with him further he is not completely sure what medications he is taking have asked him to return in 1 week with all his medication bottles.  I have encouraged him to monitor his blood pressure at home goal should be less than 140/90 I have instructed him to take Diovan 320 mg once a day metoprolol 25 mg each day.  He denies chest pain or palpitations no pedal edema    He is complaining of cough and congestion for 1 week he is a current smoker cough is productive of thick yellow secretions he denies any increased shortness of air no fever no chills denies any exposure to COVID.  No pedal edema chest pain or palpitations.  He has been encouraged to quit smoking he does admit to wheezing with exertion and when he lays down at night.     He also reports a sensation of tickling in his ears, which he attempted to alleviate with Q-tips this morning.  He does have a history of cerumen impaction especially on the left he feels like it is completely stopped up.  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    SOCIAL HISTORY  He smokes.         Objective   Vital Signs:   Vitals:    10/09/24 1603   BP: 178/92   Pulse: 72   Temp: 96.9 °F (36.1 °C)   TempSrc: Skin   SpO2: 96%   Weight: 83 kg (183 lb)   Height: 154.9 cm (60.98\")     Body mass index is 34.6 kg/m².    Wt Readings from Last 3 Encounters:   10/09/24 83 kg (183 lb)   05/01/23 84.3 kg (185 lb 12.8 oz)   02/16/22 84.2 kg (185 lb 9.6 oz)     BP Readings from Last 3 Encounters:   10/09/24 178/92   05/01/23 (!) 226/99   02/16/22 (!) 202/110       Health Maintenance   Topic Date Due    BMI FOLLOWUP  Never done    COLORECTAL CANCER SCREENING  Never done    TDAP/TD VACCINES (1 - Tdap) Never done    ZOSTER VACCINE (1 of 2) Never done    ANNUAL WELLNESS VISIT  07/09/2021    Pneumococcal Vaccine 65+ (2 of 2 - PCV) 06/20/2023    LIPID PANEL  05/01/2024    INFLUENZA VACCINE  08/01/2024    COVID-19 Vaccine (4 - 2023-24 season) 09/01/2024    HEPATITIS C SCREENING  Completed       Physical Exam  Vitals and nursing note reviewed.   Constitutional:       Appearance: Normal appearance.   HENT:      Head: Normocephalic.      Comments: Left ear with croup complete cerumen impaction stylet used and a large amount of dark hard cerumen removed attempted to use tap water to lavage however only a small amount received rest of the impaction is very deep patient is sensitive will set him up with Debrox drops and reattempt in 1 week  Eyes:      Extraocular Movements: Extraocular movements intact.      Pupils: Pupils are equal, round, and reactive to light.   Cardiovascular:      Rate and Rhythm: Normal rate and regular rhythm.      Pulses: Normal pulses.   Pulmonary:      Effort: Pulmonary effort is normal.      Breath sounds: Normal breath sounds.   Abdominal:      Palpations: Abdomen is soft.   Musculoskeletal:         General: Normal range of motion.      Cervical back: Normal range of motion.   Skin:     " General: Skin is warm and dry.   Neurological:      General: No focal deficit present.      Mental Status: He is alert.   Psychiatric:         Mood and Affect: Mood normal.         Behavior: Behavior normal.         Thought Content: Thought content normal.          Physical Exam  Right ear appears normal, but there is still quite a bit of cerumen.    Vital Signs  Blood pressure measures at 184/98.       Result Review :  The following data was reviewed by: ARNEL Fields on 10/09/2024:    Labs  No visits with results within 2 Month(s) from this visit.   Latest known visit with results is:   Lab on 09/29/2021   Component Date Value Ref Range Status    Glucose 09/29/2021 93  65 - 99 mg/dL Final    BUN 09/29/2021 25 (H)  8 - 23 mg/dL Final    Creatinine 09/29/2021 1.14  0.76 - 1.27 mg/dL Final    Sodium 09/29/2021 140  136 - 145 mmol/L Final    Potassium 09/29/2021 3.7  3.5 - 5.2 mmol/L Final    Chloride 09/29/2021 107  98 - 107 mmol/L Final    CO2 09/29/2021 22.8  22.0 - 29.0 mmol/L Final    Calcium 09/29/2021 9.5  8.6 - 10.5 mg/dL Final    Total Protein 09/29/2021 7.2  6.0 - 8.5 g/dL Final    Albumin 09/29/2021 4.30  3.50 - 5.20 g/dL Final    ALT (SGPT) 09/29/2021 15  1 - 41 U/L Final    AST (SGOT) 09/29/2021 18  1 - 40 U/L Final    Alkaline Phosphatase 09/29/2021 36 (L)  39 - 117 U/L Final    Total Bilirubin 09/29/2021 0.3  0.0 - 1.2 mg/dL Final    eGFR Non  Amer 09/29/2021 65  >60 mL/min/1.73 Final    Globulin 09/29/2021 2.9  gm/dL Final    A/G Ratio 09/29/2021 1.5  g/dL Final    BUN/Creatinine Ratio 09/29/2021 21.9  7.0 - 25.0 Final    Anion Gap 09/29/2021 10.2  5.0 - 15.0 mmol/L Final    Total Cholesterol 09/29/2021 140  0 - 200 mg/dL Final    Triglycerides 09/29/2021 165 (H)  0 - 150 mg/dL Final    HDL Cholesterol 09/29/2021 28 (L)  40 - 60 mg/dL Final    LDL Cholesterol  09/29/2021 83  0 - 100 mg/dL Final    VLDL Cholesterol 09/29/2021 29  5 - 40 mg/dL Final    LDL/HDL Ratio 09/29/2021 2.82    Final    PSA 09/29/2021 4.750 (H)  0.000 - 4.000 ng/mL Final    WBC 09/29/2021 10.13  3.40 - 10.80 10*3/mm3 Final    RBC 09/29/2021 4.71  4.14 - 5.80 10*6/mm3 Final    Hemoglobin 09/29/2021 13.7  13.0 - 17.7 g/dL Final    Hematocrit 09/29/2021 41.3  37.5 - 51.0 % Final    MCV 09/29/2021 87.7  79.0 - 97.0 fL Final    MCH 09/29/2021 29.1  26.6 - 33.0 pg Final    MCHC 09/29/2021 33.2  31.5 - 35.7 g/dL Final    RDW 09/29/2021 13.6  12.3 - 15.4 % Final    RDW-SD 09/29/2021 44.3  37.0 - 54.0 fl Final    MPV 09/29/2021 10.4  6.0 - 12.0 fL Final    Platelets 09/29/2021 542 (H)  140 - 450 10*3/mm3 Final    Neutrophil % 09/29/2021 64.9  42.7 - 76.0 % Final    Lymphocyte % 09/29/2021 19.4 (L)  19.6 - 45.3 % Final    Monocyte % 09/29/2021 10.4  5.0 - 12.0 % Final    Eosinophil % 09/29/2021 3.6  0.3 - 6.2 % Final    Basophil % 09/29/2021 0.9  0.0 - 1.5 % Final    Immature Grans % 09/29/2021 0.8 (H)  0.0 - 0.5 % Final    Neutrophils, Absolute 09/29/2021 6.58  1.70 - 7.00 10*3/mm3 Final    Lymphocytes, Absolute 09/29/2021 1.97  0.70 - 3.10 10*3/mm3 Final    Monocytes, Absolute 09/29/2021 1.05 (H)  0.10 - 0.90 10*3/mm3 Final    Eosinophils, Absolute 09/29/2021 0.36  0.00 - 0.40 10*3/mm3 Final    Basophils, Absolute 09/29/2021 0.09  0.00 - 0.20 10*3/mm3 Final    Immature Grans, Absolute 09/29/2021 0.08 (H)  0.00 - 0.05 10*3/mm3 Final    nRBC 09/29/2021 0.0  0.0 - 0.2 /100 WBC Final        Imaging  No Images in the past 120 days found..    Results         Procedures         ASSESSMENT & PLAN  Diagnoses and all orders for this visit:    1. Bronchitis (Primary)  -     azithromycin (Zithromax Z-Reinier) 250 MG tablet; Take 2 tablets by mouth on day 1, then 1 tablet daily on days 2-5  Dispense: 6 tablet; Refill: 0  -     predniSONE (DELTASONE) 5 MG tablet; Take 1 tablet by mouth Daily.  Dispense: 14 tablet; Refill: 0    2. Impacted cerumen of left ear  -     carbamide peroxide (Debrox) 6.5 % otic solution; Administer 5 drops into the left  ear 2 (Two) Times a Day.  Dispense: 15 mL; Refill: 1    3. Primary hypertension         Assessment & Plan  1. Hypertension.  His blood pressure readings are significantly elevated at 184/98, with repeat 178/92 posing a risk for cardiovascular events such as heart attack or stroke. He was advised to resume taking Diovan and Lopressor and to maintain proper hydration to mitigate dizziness.  Decrease his caffeine and sodium intake he was instructed to bring all his current medications to the next visit for review. He was also advised to schedule a follow-up appointment with Dr. De Souza to ensure his blood pressure is controlled.    2. Bronchitis.  An antibiotic regimen will be initiated to manage his bronchitis. He was strongly advised to cease smoking. Additionally, a steroid prescription will be provided to help with his symptoms. Ear drops will be given to help clear his ears, and a follow-up appointment will be scheduled in a week to reassess his condition and perform an ear flush if necessary.      3.  Left cerumen impaction-large amount of cerumen removed with stylette patient tolerated well attempted to irrigate with tap water small amount of cerumen removed continues to have cerumen blocking unable to visualize tympanic membrane.  Encouraged to not use Q-tips will send over Debrox use as instructed see in 1 week for further evaluation of the ear once the cerumen has been allowed to soften      Follow-up  Return in 1 week for follow up.                  FOLLOW UP  Return in about 1 week (around 10/16/2024).  Patient was given instructions and counseling regarding his condition or for health maintenance advice. Please see specific information pulled into the AVS if appropriate.     Patient or patient representative verbalized consent for the use of Ambient Listening during the visit with  ARNEL Fields for chart documentation. 10/9/2024  16:32 EDT    ARNEL Fields  10/09/24  16:35  EDT

## 2024-12-10 RX ORDER — VALSARTAN 320 MG/1
320 TABLET ORAL DAILY
Qty: 90 TABLET | Refills: 0 | Status: SHIPPED | OUTPATIENT
Start: 2024-12-10

## 2025-01-02 DIAGNOSIS — I10 HYPERTENSION, UNSPECIFIED TYPE: ICD-10-CM

## 2025-01-02 RX ORDER — POTASSIUM CHLORIDE 750 MG/1
10 TABLET, EXTENDED RELEASE ORAL DAILY
Qty: 90 TABLET | Refills: 3 | Status: SHIPPED | OUTPATIENT
Start: 2025-01-02

## 2025-01-27 RX ORDER — METOPROLOL TARTRATE 25 MG/1
25 TABLET, FILM COATED ORAL 2 TIMES DAILY WITH MEALS
Qty: 180 TABLET | Refills: 3 | Status: SHIPPED | OUTPATIENT
Start: 2025-01-27